# Patient Record
Sex: FEMALE | Race: WHITE | Employment: STUDENT
[De-identification: names, ages, dates, MRNs, and addresses within clinical notes are randomized per-mention and may not be internally consistent; named-entity substitution may affect disease eponyms.]

---
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
## 2019-03-19 ENCOUNTER — OFFICE VISIT (OUTPATIENT)
Dept: FAMILY MEDICINE CLINIC | Age: 19
End: 2019-03-19
Payer: COMMERCIAL

## 2019-03-19 VITALS
TEMPERATURE: 98.1 F | HEART RATE: 94 BPM | WEIGHT: 196 LBS | BODY MASS INDEX: 30.76 KG/M2 | HEIGHT: 67 IN | SYSTOLIC BLOOD PRESSURE: 110 MMHG | DIASTOLIC BLOOD PRESSURE: 64 MMHG | OXYGEN SATURATION: 98 %

## 2019-03-19 DIAGNOSIS — F33.0 MILD EPISODE OF RECURRENT MAJOR DEPRESSIVE DISORDER (HCC): Primary | ICD-10-CM

## 2019-03-19 PROCEDURE — G0444 DEPRESSION SCREEN ANNUAL: HCPCS | Performed by: NURSE PRACTITIONER

## 2019-03-19 PROCEDURE — 99213 OFFICE O/P EST LOW 20 MIN: CPT | Performed by: NURSE PRACTITIONER

## 2019-03-19 RX ORDER — SERTRALINE HYDROCHLORIDE 25 MG/1
25 TABLET, FILM COATED ORAL DAILY
Qty: 30 TABLET | Refills: 0 | Status: SHIPPED | OUTPATIENT
Start: 2019-03-19 | End: 2019-03-27 | Stop reason: SDUPTHER

## 2019-03-19 ASSESSMENT — PATIENT HEALTH QUESTIONNAIRE - PHQ9
4. FEELING TIRED OR HAVING LITTLE ENERGY: 3
SUM OF ALL RESPONSES TO PHQ QUESTIONS 1-9: 15
8. MOVING OR SPEAKING SO SLOWLY THAT OTHER PEOPLE COULD HAVE NOTICED. OR THE OPPOSITE, BEING SO FIGETY OR RESTLESS THAT YOU HAVE BEEN MOVING AROUND A LOT MORE THAN USUAL: 0
5. POOR APPETITE OR OVEREATING: 0
10. IF YOU CHECKED OFF ANY PROBLEMS, HOW DIFFICULT HAVE THESE PROBLEMS MADE IT FOR YOU TO DO YOUR WORK, TAKE CARE OF THINGS AT HOME, OR GET ALONG WITH OTHER PEOPLE: 1
SUM OF ALL RESPONSES TO PHQ9 QUESTIONS 1 & 2: 5
7. TROUBLE CONCENTRATING ON THINGS, SUCH AS READING THE NEWSPAPER OR WATCHING TELEVISION: 1
3. TROUBLE FALLING OR STAYING ASLEEP: 3
1. LITTLE INTEREST OR PLEASURE IN DOING THINGS: 3
2. FEELING DOWN, DEPRESSED OR HOPELESS: 2
9. THOUGHTS THAT YOU WOULD BE BETTER OFF DEAD, OR OF HURTING YOURSELF: 0
6. FEELING BAD ABOUT YOURSELF - OR THAT YOU ARE A FAILURE OR HAVE LET YOURSELF OR YOUR FAMILY DOWN: 3
SUM OF ALL RESPONSES TO PHQ QUESTIONS 1-9: 15

## 2019-03-19 ASSESSMENT — ENCOUNTER SYMPTOMS
COUGH: 0
NAUSEA: 0
VOMITING: 0
DIARRHEA: 0
SHORTNESS OF BREATH: 0

## 2019-03-27 RX ORDER — SERTRALINE HYDROCHLORIDE 25 MG/1
25 TABLET, FILM COATED ORAL DAILY
Qty: 30 TABLET | Refills: 0 | Status: SHIPPED | OUTPATIENT
Start: 2019-03-27 | End: 2019-04-17 | Stop reason: SDUPTHER

## 2019-04-17 ENCOUNTER — OFFICE VISIT (OUTPATIENT)
Dept: FAMILY MEDICINE CLINIC | Age: 19
End: 2019-04-17
Payer: COMMERCIAL

## 2019-04-17 VITALS
TEMPERATURE: 98 F | BODY MASS INDEX: 30.7 KG/M2 | WEIGHT: 196 LBS | SYSTOLIC BLOOD PRESSURE: 112 MMHG | OXYGEN SATURATION: 98 % | HEART RATE: 88 BPM | DIASTOLIC BLOOD PRESSURE: 60 MMHG

## 2019-04-17 DIAGNOSIS — F33.0 MILD EPISODE OF RECURRENT MAJOR DEPRESSIVE DISORDER (HCC): Primary | ICD-10-CM

## 2019-04-17 PROCEDURE — 99213 OFFICE O/P EST LOW 20 MIN: CPT | Performed by: NURSE PRACTITIONER

## 2019-04-17 RX ORDER — SERTRALINE HYDROCHLORIDE 25 MG/1
25 TABLET, FILM COATED ORAL DAILY
Qty: 30 TABLET | Refills: 2 | Status: SHIPPED | OUTPATIENT
Start: 2019-04-17 | End: 2022-07-05 | Stop reason: ALTCHOICE

## 2019-04-17 ASSESSMENT — ENCOUNTER SYMPTOMS
NAUSEA: 0
DIARRHEA: 0
VOMITING: 0
SHORTNESS OF BREATH: 0
COUGH: 0

## 2019-04-17 NOTE — PROGRESS NOTES
HPI Notes    Name: Malena Pino  : 2000         Chief Complaint:     Chief Complaint   Patient presents with   [address] Problem     Patient here today for follow up on anxiety after starting sertraline 25mg daily. Patient said she feels the medicine is helping her. History of Present Illness:        Mental Health Problem   The primary symptoms include dysphoric mood. The current episode started more than 1 month ago. This is a recurrent problem. The onset of the illness is precipitated by emotional stress. The degree of incapacity that she is experiencing as a consequence of her illness is mild. Additional symptoms of the illness do not include insomnia or hypersomnia. She does not admit to suicidal ideas. She does not have a plan to commit suicide. She does not contemplate harming herself. She has not already injured self. She does not contemplate injuring another person. She has not already  injured another person. Past Medical History:     Past Medical History:   Diagnosis Date    Irregular menstrual bleeding       Reviewed all health maintenance requirements and ordered appropriate tests  Health Maintenance Due   Topic Date Due    Varicella Vaccine (1 of 2 - 13+ 2-dose series) 2011    HPV vaccine (1 - Female 3-dose series) 2013    HIV screen  2013    Chlamydia screen  2014    DTaP/Tdap/Td vaccine (1 - Tdap) 2017       Past Surgical History:     Past Surgical History:   Procedure Laterality Date    TEAR DUCT SURGERY      childhood        Medications:       Prior to Admission medications    Medication Sig Start Date End Date Taking? Authorizing Provider   sertraline (ZOLOFT) 25 MG tablet Take 1 tablet by mouth daily 19  Yes Angelo Began, APRN - CNP        Allergies:       Patient has no known allergies. Social History:     Tobacco:    reports that she has never smoked.  She has never used smokeless tobacco.  Alcohol:      reports recurrent major depressive disorder ([facility] [address]       Pt doing well on current dose. Tolerating medication well. Patient denies suicidal ideation at this time, however if patient does become suicidal, stop medication and call 911. Patient verbalizes understanding and agreement with plan. All questions answered. If symptoms do not resolve or worsen, return to office. Completed Refills   Requested Prescriptions     Signed Prescriptions Disp Refills    sertraline (ZOLOFT) 25 MG tablet 30 tablet 2     Sig: Take 1 tablet by mouth daily     No follow-ups on file. Orders Placed This Encounter   Medications    sertraline (ZOLOFT) 25 MG tablet     Sig: Take 1 tablet by mouth daily     Dispense:  30 tablet     Refill:  2     We are requesting this refill to have on file for next month s order. No orders of the defined types were placed in this encounter. Patient Instructions     SURVEY:    You may be receiving a survey from [employer] regarding your visit today. Please complete the survey to enable us to provide the highest quality of care to you and your family. If you cannot score us a very good on any question, please call the office to discuss how we could have made your experience a very good one. Thank you. Electronically signed by JACIEL Altamirano CNP on 4/17/2019 at 10:53 AM           Completed Refills      Requested Prescriptions     Signed Prescriptions Disp Refills    sertraline (ZOLOFT) 25 MG tablet 30 tablet 2     Sig: Take 1 tablet by mouth daily         Tin received counseling on the following healthy behaviors: nutrition and medication adherence  Reviewed prior labs and health maintenance. Continue current medications, diet and exercise. Discussed use, benefit, and side effects of prescribed medications. Barriers to medication compliance addressed. Patient given educational materials - see patient instructions. All patient questions answered. Patient voiced understanding.

## 2019-05-20 ENCOUNTER — HOSPITAL ENCOUNTER (OUTPATIENT)
Age: 19
Discharge: HOME | End: 2019-05-20
Payer: COMMERCIAL

## 2019-05-20 ENCOUNTER — OFFICE VISIT (OUTPATIENT)
Dept: FAMILY MEDICINE CLINIC | Age: 19
End: 2019-05-20
Payer: COMMERCIAL

## 2019-05-20 VITALS
HEART RATE: 94 BPM | HEIGHT: 65 IN | OXYGEN SATURATION: 98 % | WEIGHT: 192 LBS | BODY MASS INDEX: 31.99 KG/M2 | DIASTOLIC BLOOD PRESSURE: 60 MMHG | TEMPERATURE: 98.2 F | SYSTOLIC BLOOD PRESSURE: 102 MMHG

## 2019-05-20 DIAGNOSIS — Z02.0 SCHOOL PHYSICAL EXAM: Primary | ICD-10-CM

## 2019-05-20 LAB
AMPHETAMINE SCREEN URINE: NEGATIVE
BARBITURATE SCREEN URINE: NEGATIVE
BENZODIAZEPINE SCREEN, URINE: NEGATIVE
BUPRENORPHINE URINE: NORMAL
CANNABINOID SCREEN URINE: NEGATIVE
COCAINE METABOLITE, URINE: NEGATIVE
MDMA URINE: NORMAL
METHADONE SCREEN, URINE: NEGATIVE
METHAMPHETAMINE, URINE: NEGATIVE
OPIATES, URINE: NEGATIVE
OXYCODONE SCREEN URINE: NEGATIVE
PHENCYCLIDINE, URINE: NEGATIVE
PROPOXYPHENE, URINE: NEGATIVE
TEST INFORMATION: NORMAL
TRICYCLIC ANTIDEPRESSANTS, UR: NEGATIVE

## 2019-05-20 PROCEDURE — 80306 DRUG TEST PRSMV INSTRMNT: CPT

## 2019-05-20 PROCEDURE — 99395 PREV VISIT EST AGE 18-39: CPT | Performed by: NURSE PRACTITIONER

## 2019-05-20 ASSESSMENT — ENCOUNTER SYMPTOMS
NAUSEA: 0
SORE THROAT: 0
COUGH: 0
DIARRHEA: 0
ABDOMINAL PAIN: 0
BLOOD IN STOOL: 0
SHORTNESS OF BREATH: 0
BACK PAIN: 0
VOMITING: 0
CONSTIPATION: 0

## 2019-05-20 NOTE — PROGRESS NOTES
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 Pt denies any health concerns. Pt does have a hx of depression, but is well controlled with zoloft. Pt denies any suicidal ideation or plan. Past Medical History:     Past Medical History:   Diagnosis Date    Irregular menstrual bleeding       Reviewed all health maintenance requirements and ordered appropriate tests  Health Maintenance Due   Topic Date Due    Varicella Vaccine (1 of 2 - 13+ 2-dose series) 2011    HPV vaccine (1 - Female 3-dose series) 2013    HIV screen  2013    Chlamydia screen  2014    DTaP/Tdap/Td vaccine (1 - Tdap) 2017       Past Surgical History:     Past Surgical History:   Procedure Laterality Date    TEAR DUCT SURGERY      childhood        Medications:       Prior to Admission medications    Medication Sig Start Date End Date Taking? Authorizing Provider   sertraline (ZOLOFT) 25 MG tablet Take 1 tablet by mouth daily 19  Yes JACIEL Abdalla CNP        Allergies:       Patient has no known allergies. Social History:     Tobacco:    reports that she has never smoked. She has never used smokeless tobacco.  Alcohol:      reports that she does not drink alcohol. Drug Use:  reports that she does not use drugs. Family History:        Family History   Problem Relation Age of Onset    High Blood Pressure Mother     Other Mother         hiatal hernia    High Blood Pressure Father        Review of Systems:         Review of Systems   Constitutional: Negative for chills and fever. HENT: Negative for sore throat. Respiratory: Negative for cough and shortness of breath.     Cardiovascular: Negative for chest pain, She has a normal mood and affect. Her speech is normal and behavior is normal. Judgment and thought content normal. Cognition and memory are normal.   Nursing note and vitals reviewed. Data:     No results found for: NA, K, CL, CO2, BUN, CREATININE, GLUCOSE, PROT, LABALBU, BILITOT, ALKPHOS, AST, ALT  No results found for: WBC, RBC, HGB, HCT, MCV, MCH, MCHC, RDW, PLT, MPV  No results found for: TSH  No results found for: CHOL, HDL, PSA, LABA1C       Assessment & Plan        Diagnosis Orders   1. School physical exam       Routine adult health physical.  Patient has no complaints at this time. No abnormalities or deficiencies. Patient educated about adequate water intake  Patient educated about exercise and fitness  Patient educated about nutrition    Health maintenance requirements reviewed with patient    If any further needs, return to office. Completed Refills   Requested Prescriptions      No prescriptions requested or ordered in this encounter     Return in about 1 year (around 5/20/2020). No orders of the defined types were placed in this encounter. No orders of the defined types were placed in this encounter. Patient Instructions     SURVEY:    You may be receiving a survey from [employer] regarding your visit today. Please complete the survey to enable us to provide the highest quality of care to you and your family. If you cannot score us a very good on any question, please call the office to discuss how we could have made your experience a very good one. Thank you. Electronically signed by JACIEL Zarate CNP on 5/20/2019 at 12:16 PM           Completed Refills      Requested Prescriptions      No prescriptions requested or ordered in this encounter         Sai Garcia received counseling on the following healthy behaviors: nutrition, exercise and medication adherence  Reviewed prior labs and health maintenance.   Continue current medications, diet and exercise. Discussed use, benefit, and side effects of prescribed medications. Barriers to medication compliance addressed. Patient given educational materials - see patient instructions. All patient questions answered. Patient voiced understanding.

## 2019-05-20 NOTE — PATIENT INSTRUCTIONS
SURVEY:    You may be receiving a survey from [employer] regarding your visit today. Please complete the survey to enable us to provide the highest quality of care to you and your family. If you cannot score us a very good on any question, please call the office to discuss how we could have made your experience a very good one. Thank you.

## 2019-08-21 ENCOUNTER — TELEPHONE (OUTPATIENT)
Dept: FAMILY MEDICINE CLINIC | Age: 19
End: 2019-08-21

## 2019-08-21 DIAGNOSIS — Z23 NEED FOR VARICELLA VACCINE: Primary | ICD-10-CM

## 2019-08-23 ENCOUNTER — HOSPITAL ENCOUNTER (OUTPATIENT)
Age: 19
Discharge: HOME | End: 2019-08-23
Payer: COMMERCIAL

## 2019-08-23 DIAGNOSIS — Z23 NEED FOR VARICELLA VACCINE: ICD-10-CM

## 2019-08-23 PROCEDURE — 86787 VARICELLA-ZOSTER ANTIBODY: CPT

## 2019-08-23 PROCEDURE — 36415 COLL VENOUS BLD VENIPUNCTURE: CPT

## 2019-08-26 LAB — VZV IGG SER QL IA: 3.45

## 2022-06-06 ENCOUNTER — OFFICE VISIT (OUTPATIENT)
Dept: FAMILY MEDICINE CLINIC | Age: 22
End: 2022-06-06
Payer: COMMERCIAL

## 2022-06-06 ENCOUNTER — HOSPITAL ENCOUNTER (OUTPATIENT)
Age: 22
Discharge: HOME | End: 2022-06-06
Payer: COMMERCIAL

## 2022-06-06 VITALS
BODY MASS INDEX: 34.04 KG/M2 | WEIGHT: 204.3 LBS | TEMPERATURE: 98.5 F | DIASTOLIC BLOOD PRESSURE: 82 MMHG | HEART RATE: 96 BPM | OXYGEN SATURATION: 98 % | HEIGHT: 65 IN | SYSTOLIC BLOOD PRESSURE: 118 MMHG

## 2022-06-06 DIAGNOSIS — R19.7 DIARRHEA, UNSPECIFIED TYPE: Primary | ICD-10-CM

## 2022-06-06 DIAGNOSIS — K92.1: ICD-10-CM

## 2022-06-06 DIAGNOSIS — R19.7 DIARRHEA, UNSPECIFIED TYPE: ICD-10-CM

## 2022-06-06 DIAGNOSIS — K21.9 GASTROESOPHAGEAL REFLUX DISEASE WITHOUT ESOPHAGITIS: ICD-10-CM

## 2022-06-06 DIAGNOSIS — R10.9 ABDOMINAL CRAMPING: ICD-10-CM

## 2022-06-06 LAB
ABSOLUTE EOS #: 0 K/UL (ref 0–0.4)
ABSOLUTE LYMPH #: 1.9 K/UL (ref 1–4.8)
ABSOLUTE MONO #: 0.5 K/UL (ref 0–1)
ALBUMIN SERPL-MCNC: 4.3 G/DL (ref 3.5–5.2)
ALP BLD-CCNC: 77 U/L (ref 35–104)
ALT SERPL-CCNC: 23 U/L (ref 5–33)
ANION GAP SERPL CALCULATED.3IONS-SCNC: 11 MMOL/L (ref 9–17)
AST SERPL-CCNC: 15 U/L
BASOPHILS # BLD: 0 % (ref 0–2)
BASOPHILS ABSOLUTE: 0 K/UL (ref 0–0.2)
BILIRUB SERPL-MCNC: 0.51 MG/DL (ref 0.3–1.2)
BUN BLDV-MCNC: 9 MG/DL (ref 6–20)
BUN/CREAT BLD: 12 (ref 9–20)
CALCIUM SERPL-MCNC: 9.6 MG/DL (ref 8.6–10.4)
CHLORIDE BLD-SCNC: 102 MMOL/L (ref 98–107)
CO2: 25 MMOL/L (ref 20–31)
CREAT SERPL-MCNC: 0.77 MG/DL (ref 0.5–0.9)
DIFFERENTIAL TYPE: YES
EOSINOPHILS RELATIVE PERCENT: 0 % (ref 0–5)
GFR AFRICAN AMERICAN: >60 ML/MIN
GFR NON-AFRICAN AMERICAN: >60 ML/MIN
GFR SERPL CREATININE-BSD FRML MDRD: NORMAL ML/MIN/{1.73_M2}
GLUCOSE BLD-MCNC: 93 MG/DL (ref 70–99)
HCT VFR BLD CALC: 43.5 % (ref 36–46)
HEMOGLOBIN: 14.6 G/DL (ref 12–16)
LYMPHOCYTES # BLD: 21 % (ref 15–40)
MCH RBC QN AUTO: 28.9 PG (ref 26–34)
MCHC RBC AUTO-ENTMCNC: 33.5 G/DL (ref 31–37)
MCV RBC AUTO: 86.2 FL (ref 80–100)
MONOCYTES # BLD: 5 % (ref 4–8)
PDW BLD-RTO: 12.3 % (ref 12.1–15.2)
PLATELET # BLD: 265 K/UL (ref 140–450)
POTASSIUM SERPL-SCNC: 4.2 MMOL/L (ref 3.7–5.3)
RBC # BLD: 5.05 M/UL (ref 4–5.2)
SEG NEUTROPHILS: 74 % (ref 47–75)
SEGMENTED NEUTROPHILS ABSOLUTE COUNT: 6.6 K/UL (ref 2.5–7)
SODIUM BLD-SCNC: 138 MMOL/L (ref 135–144)
TOTAL PROTEIN: 7.4 G/DL (ref 6.4–8.3)
WBC # BLD: 9.1 K/UL (ref 3.5–11)

## 2022-06-06 PROCEDURE — 99213 OFFICE O/P EST LOW 20 MIN: CPT | Performed by: NURSE PRACTITIONER

## 2022-06-06 PROCEDURE — 85025 COMPLETE CBC W/AUTO DIFF WBC: CPT

## 2022-06-06 PROCEDURE — 80053 COMPREHEN METABOLIC PANEL: CPT

## 2022-06-06 PROCEDURE — 82784 ASSAY IGA/IGD/IGG/IGM EACH: CPT

## 2022-06-06 PROCEDURE — 81382 HLA II TYPING 1 LOC HR: CPT

## 2022-06-06 PROCEDURE — 88346 IMFLUOR 1ST 1ANTB STAIN PX: CPT

## 2022-06-06 PROCEDURE — 83520 IMMUNOASSAY QUANT NOS NONAB: CPT

## 2022-06-06 PROCEDURE — 36415 COLL VENOUS BLD VENIPUNCTURE: CPT

## 2022-06-06 RX ORDER — OMEPRAZOLE 20 MG/1
20 CAPSULE, DELAYED RELEASE ORAL DAILY
Qty: 30 CAPSULE | Refills: 0 | Status: SHIPPED | OUTPATIENT
Start: 2022-06-06 | End: 2022-07-05 | Stop reason: SDUPTHER

## 2022-06-06 SDOH — ECONOMIC STABILITY: FOOD INSECURITY: WITHIN THE PAST 12 MONTHS, THE FOOD YOU BOUGHT JUST DIDN'T LAST AND YOU DIDN'T HAVE MONEY TO GET MORE.: NEVER TRUE

## 2022-06-06 SDOH — ECONOMIC STABILITY: FOOD INSECURITY: WITHIN THE PAST 12 MONTHS, YOU WORRIED THAT YOUR FOOD WOULD RUN OUT BEFORE YOU GOT MONEY TO BUY MORE.: NEVER TRUE

## 2022-06-06 ASSESSMENT — PATIENT HEALTH QUESTIONNAIRE - PHQ9
6. FEELING BAD ABOUT YOURSELF - OR THAT YOU ARE A FAILURE OR HAVE LET YOURSELF OR YOUR FAMILY DOWN: 0
2. FEELING DOWN, DEPRESSED OR HOPELESS: 3
SUM OF ALL RESPONSES TO PHQ QUESTIONS 1-9: 8
10. IF YOU CHECKED OFF ANY PROBLEMS, HOW DIFFICULT HAVE THESE PROBLEMS MADE IT FOR YOU TO DO YOUR WORK, TAKE CARE OF THINGS AT HOME, OR GET ALONG WITH OTHER PEOPLE: 0
4. FEELING TIRED OR HAVING LITTLE ENERGY: 2
8. MOVING OR SPEAKING SO SLOWLY THAT OTHER PEOPLE COULD HAVE NOTICED. OR THE OPPOSITE, BEING SO FIGETY OR RESTLESS THAT YOU HAVE BEEN MOVING AROUND A LOT MORE THAN USUAL: 0
SUM OF ALL RESPONSES TO PHQ QUESTIONS 1-9: 8
SUM OF ALL RESPONSES TO PHQ QUESTIONS 1-9: 8
1. LITTLE INTEREST OR PLEASURE IN DOING THINGS: 0
SUM OF ALL RESPONSES TO PHQ QUESTIONS 1-9: 8
3. TROUBLE FALLING OR STAYING ASLEEP: 3
5. POOR APPETITE OR OVEREATING: 0
7. TROUBLE CONCENTRATING ON THINGS, SUCH AS READING THE NEWSPAPER OR WATCHING TELEVISION: 0
9. THOUGHTS THAT YOU WOULD BE BETTER OFF DEAD, OR OF HURTING YOURSELF: 0
SUM OF ALL RESPONSES TO PHQ9 QUESTIONS 1 & 2: 3

## 2022-06-06 ASSESSMENT — ENCOUNTER SYMPTOMS
COUGH: 0
SHORTNESS OF BREATH: 0
BLOATING: 1
VOMITING: 0
NAUSEA: 0
DIARRHEA: 1
ABDOMINAL PAIN: 1

## 2022-06-06 ASSESSMENT — SOCIAL DETERMINANTS OF HEALTH (SDOH): HOW HARD IS IT FOR YOU TO PAY FOR THE VERY BASICS LIKE FOOD, HOUSING, MEDICAL CARE, AND HEATING?: NOT HARD AT ALL

## 2022-06-06 NOTE — PROGRESS NOTES
HPI Notes    Name: Nessa Pitts  : 2000         Chief Complaint:     Chief Complaint   Patient presents with    Diarrhea     pt stated she has had biarrhea for many years, last 2 bowel movements she had had bool in the stool       History of Present Illness:        Diarrhea   This is a chronic problem. The current episode started more than 1 year ago. The problem occurs 5 to 10 times per day. The problem has been waxing and waning. The stool consistency is described as bloody (blood in stool on 6/3/22). The patient states that diarrhea awakens (about once a week) her from sleep. Associated symptoms include abdominal pain and bloating. Pertinent negatives include no chills, coughing, fever, headaches or vomiting. The symptoms are aggravated by rye/wheat and stress. There are no known risk factors. She has tried anti-motility drug (TUMS) for the symptoms. The treatment provided mild relief. Past Medical History:     Past Medical History:   Diagnosis Date    Irregular menstrual bleeding       Reviewed all health maintenance requirements and ordered appropriate tests  Health Maintenance Due   Topic Date Due    Varicella vaccine (1 of 2 - 2-dose childhood series) Never done    HPV vaccine (1 - 2-dose series) Never done    Depression Screen  Never done    HIV screen  Never done    Chlamydia screen  Never done    Hepatitis C screen  Never done    Pap smear  Never done       Past Surgical History:     Past Surgical History:   Procedure Laterality Date    TEAR DUCT SURGERY      childhood        Medications:       Prior to Admission medications    Medication Sig Start Date End Date Taking?  Authorizing Provider   omeprazole (PRILOSEC) 20 MG delayed release capsule Take 1 capsule by mouth daily 22  Yes JACIEL Lan CNP   sertraline (ZOLOFT) 25 MG tablet Take 1 tablet by mouth daily  Patient not taking: Reported on 2022   JACIEL Lan CNP        Allergies: Patient has no known allergies. Social History:     Tobacco:    reports that she has never smoked. She has never used smokeless tobacco.  Alcohol:      reports no history of alcohol use. Drug Use:  reports no history of drug use. Family History:     Family History   Problem Relation Age of Onset    High Blood Pressure Mother     Other Mother         hiatal hernia    High Blood Pressure Father        Review of Systems:         Review of Systems   Constitutional: Negative for chills and fever. Respiratory: Negative for cough and shortness of breath. Cardiovascular: Negative for chest pain and palpitations. Gastrointestinal: Positive for abdominal pain, bloating and diarrhea. Negative for nausea and vomiting. Neurological: Negative for dizziness, seizures and headaches. Physical Exam:     Vitals:  /82   Pulse 96   Temp 98.5 °F (36.9 °C) (Oral)   Ht 5' 5\" (1.651 m)   Wt 204 lb 4.8 oz (92.7 kg)   LMP 05/15/2022   SpO2 98%   BMI 34.00 kg/m²       Physical Exam  Vitals and nursing note reviewed. Constitutional:       Appearance: Normal appearance. She is well-developed. Cardiovascular:      Rate and Rhythm: Normal rate and regular rhythm. Heart sounds: Normal heart sounds, S1 normal and S2 normal.   Pulmonary:      Effort: Pulmonary effort is normal. No respiratory distress. Breath sounds: Normal breath sounds. Abdominal:      General: Bowel sounds are normal.      Palpations: Abdomen is soft. Tenderness: There is no abdominal tenderness. Skin:     General: Skin is warm and dry. Neurological:      Mental Status: She is alert and oriented to person, place, and time.                Data:     No results found for: NA, K, CL, CO2, BUN, CREATININE, GLUCOSE, PROT, LABALBU, BILITOT, ALKPHOS, AST, ALT  No results found for: WBC, RBC, HGB, HCT, MCV, MCH, MCHC, RDW, PLT, MPV  No results found for: TSH  No results found for: CHOL, LDL, HDL, PSA, LABA1C Assessment & Plan        Diagnosis Orders   1. Diarrhea, unspecified type  Celiac Disease Panel    CBC with Auto Differential   2. Blood in stool, fel[place]  CBC with Auto Differential    Comprehensive Metabolic Panel   3. Gastroesophageal reflux disease without esophagitis  Comprehensive Metabolic Panel   4. Abdominal cramping  Celiac Disease Panel     Diff Dx includes: IBS-D vs GERD vs celiac disease vs other. Will start back on omeprazole as this was effective in the past. Will send for labs. Completed Refills   Requested Prescriptions     Signed Prescriptions Disp Refills    omeprazole (PRILOSEC) 20 MG delayed release capsule 30 capsule 0     Sig: Take 1 capsule by mouth daily     Return in about 4 weeks (around 7/4/2022) for GERD. Orders Placed This Encounter   Medications    omeprazole (PRILOSEC) 20 MG delayed release capsule     Sig: Take 1 capsule by mouth daily     Dispense:  30 capsule     Refill:  0     Orders Placed This Encounter   Procedures    Celiac Disease Panel     Standing Status:   Future     Standing Expiration Date:   6/6/2023    CBC with Auto Differential     Standing Status:   Future     Standing Expiration Date:   7/11/2023    Comprehensive Metabolic Panel     Standing Status:   Future     Standing Expiration Date:   7/11/2023         There are no Patient Instructions on file for this visit.     Electronically signed by JACIEL Moraes CNP on 6/6/2022 at 11:12 AM           Completed Refills   Requested Prescriptions     Signed Prescriptions Disp Refills    omeprazole (PRILOSEC) 20 MG delayed release capsule 30 capsule 0     Sig: Take 1 capsule by mouth daily

## 2022-06-10 LAB — CELIAC PANEL: NORMAL

## 2022-07-05 ENCOUNTER — OFFICE VISIT (OUTPATIENT)
Dept: FAMILY MEDICINE CLINIC | Age: 22
End: 2022-07-05
Payer: COMMERCIAL

## 2022-07-05 VITALS
SYSTOLIC BLOOD PRESSURE: 98 MMHG | OXYGEN SATURATION: 98 % | HEART RATE: 88 BPM | DIASTOLIC BLOOD PRESSURE: 64 MMHG | TEMPERATURE: 98.1 F

## 2022-07-05 DIAGNOSIS — K21.9 GASTROESOPHAGEAL REFLUX DISEASE WITHOUT ESOPHAGITIS: Primary | ICD-10-CM

## 2022-07-05 DIAGNOSIS — K58.0 IRRITABLE BOWEL SYNDROME WITH DIARRHEA: ICD-10-CM

## 2022-07-05 PROCEDURE — 99213 OFFICE O/P EST LOW 20 MIN: CPT | Performed by: NURSE PRACTITIONER

## 2022-07-05 RX ORDER — OMEPRAZOLE 20 MG/1
20 CAPSULE, DELAYED RELEASE ORAL DAILY
Qty: 90 CAPSULE | Refills: 1 | Status: SHIPPED | OUTPATIENT
Start: 2022-07-05

## 2022-07-05 ASSESSMENT — ENCOUNTER SYMPTOMS
HEARTBURN: 0
ABDOMINAL PAIN: 0
VOMITING: 0
SHORTNESS OF BREATH: 0
COUGH: 0
DIARRHEA: 0
GLOBUS SENSATION: 0
NAUSEA: 0

## 2022-07-05 NOTE — PATIENT INSTRUCTIONS
SURVEY:    You may be receiving a survey from [employer] regarding your visit today. Please complete the survey to enable us to provide the highest quality of care to you and your family. If you cannot score us a very good (5 Stars) on any question, please call the office to discuss how we could have made your experience a very good one. Thank you.     Clinical Care Team: JACIEL Colon-TANYA Granda LPN    Clerical Team: Negrita Mancilla

## 2022-07-05 NOTE — PROGRESS NOTES
HPI Notes    Name: Ray Fan  : 2000         Chief Complaint:     Chief Complaint   Patient presents with    Gastroesophageal Reflux     Patient here today for follow up after starting the omeprazole 20 mg , patient said she is doing better. History of Present Illness:        Gastroesophageal Reflux  She reports no abdominal pain, no chest pain, no coughing, no globus sensation, no heartburn or no nausea. This is a chronic problem. The current episode started more than 1 month ago. Nothing aggravates the symptoms. She has tried a PPI for the symptoms. Still having diarrhea, about 5-7 times daily. Mostly soft and partly watery consistency. Takes imodium and it slows down for that day, but returns the next day. Past Medical History:     Past Medical History:   Diagnosis Date    Irregular menstrual bleeding       Reviewed all health maintenance requirements and ordered appropriate tests  Health Maintenance Due   Topic Date Due    Varicella vaccine (1 of 2 - 2-dose childhood series) Never done    HPV vaccine (1 - 2-dose series) Never done    HIV screen  Never done    Chlamydia screen  Never done    Hepatitis C screen  Never done    Pap smear  Never done       Past Surgical History:     Past Surgical History:   Procedure Laterality Date    TEAR DUCT SURGERY      childhood        Medications:       Prior to Admission medications    Medication Sig Start Date End Date Taking? Authorizing Provider   omeprazole (PRILOSEC) 20 MG delayed release capsule Take 1 capsule by mouth daily 22  Yes JACIEL Dacosta CNP   Eluxadoline 100 MG TABS Take 100 mg by mouth 2 times daily for 30 days. 22 Yes JACIEL Dacosta CNP        Allergies:       Patient has no known allergies. Social History:     Tobacco:    reports that she has never smoked. She has never used smokeless tobacco.  Alcohol:      reports no history of alcohol use.   Drug Use:  reports no history of drug use. Family History:     Family History   Problem Relation Age of Onset    High Blood Pressure Mother     Other Mother         hiatal hernia    High Blood Pressure Father        Review of Systems:         Review of Systems   Constitutional: Negative for chills and fever. Respiratory: Negative for cough and shortness of breath. Cardiovascular: Negative for chest pain and palpitations. Gastrointestinal: Negative for abdominal pain, diarrhea, heartburn, nausea and vomiting. Neurological: Negative for dizziness, seizures and headaches. Physical Exam:     Vitals:  BP 98/64   Pulse 88   Temp 98.1 °F (36.7 °C) (Oral)   SpO2 98%       Physical Exam  Vitals and nursing note reviewed. Constitutional:       Appearance: Normal appearance. She is well-developed. Cardiovascular:      Rate and Rhythm: Normal rate and regular rhythm. Heart sounds: Normal heart sounds, S1 normal and S2 normal.   Pulmonary:      Effort: Pulmonary effort is normal. No respiratory distress. Breath sounds: Normal breath sounds. Abdominal:      General: Bowel sounds are normal.      Palpations: Abdomen is soft. Tenderness: There is no abdominal tenderness. Skin:     General: Skin is warm and dry. Neurological:      Mental Status: She is alert and oriented to person, place, and time.                Data:     Lab Results   Component Value Date/Time     06/06/2022 11:29 AM    K 4.2 06/06/2022 11:29 AM     06/06/2022 11:29 AM    CO2 25 06/06/2022 11:29 AM    BUN 9 06/06/2022 11:29 AM    CREATININE 0.77 06/06/2022 11:29 AM    GLUCOSE 93 06/06/2022 11:29 AM    PROT 7.4 06/06/2022 11:29 AM    LABALBU 4.3 06/06/2022 11:29 AM    BILITOT 0.51 06/06/2022 11:29 AM    ALKPHOS 77 06/06/2022 11:29 AM    AST 15 06/06/2022 11:29 AM    ALT 23 06/06/2022 11:29 AM     Lab Results   Component Value Date/Time    WBC 9.1 06/06/2022 11:29 AM    RBC 5.05 06/06/2022 11:29 AM    HGB 14.6 06/06/2022 11:29 AM    HCT 43.5 06/06/2022 11:29 AM    MCV 86.2 06/06/2022 11:29 AM    MCH 28.9 06/06/2022 11:29 AM    MCHC 33.5 06/06/2022 11:29 AM    RDW 12.3 06/06/2022 11:29 AM     06/06/2022 11:29 AM     No results found for: TSH  No results found for: CHOL, LDL, HDL, PSA, LABA1C       Assessment & Plan        Diagnosis Orders   1. Gastroesophageal reflux disease without esophagitis  -- Doing well with omeprazole 20 mg daily. Symptoms of abdominal cramping have resolved. Continue same medication. 2. Irritable bowel syndrome with diarrhea   -- Despite the abdominal cramping going away, patient still having multiple diarrhea stools daily. Will try Viberzi 100 mg p.o. twice daily x30 days. If symptoms do not improve, will send to GI for evaluation. Eluxadoline 100 MG TABS     Patient verbalizes understanding and agreement with plan. All questions answered. If symptoms do not resolve or worsen, return to office. Completed Refills   Requested Prescriptions     Signed Prescriptions Disp Refills    omeprazole (PRILOSEC) 20 MG delayed release capsule 90 capsule 1     Sig: Take 1 capsule by mouth daily    Eluxadoline 100 MG TABS 60 tablet 0     Sig: Take 100 mg by mouth 2 times daily for 30 days. No follow-ups on file. Orders Placed This Encounter   Medications    omeprazole (PRILOSEC) 20 MG delayed release capsule     Sig: Take 1 capsule by mouth daily     Dispense:  90 capsule     Refill:  1    Eluxadoline 100 MG TABS     Sig: Take 100 mg by mouth 2 times daily for 30 days. Dispense:  60 tablet     Refill:  0     No orders of the defined types were placed in this encounter. Patient Instructions   SURVEY:    You may be receiving a survey from [employer] regarding your visit today. Please complete the survey to enable us to provide the highest quality of care to you and your family.     If you cannot score us a very good (5 Stars) on any question, please call the office to discuss how we could have made your experience a very good one. Thank you. Clinical Care Team: LORRAINE Coleman LPN    Clerical Team: Jayy Martinez        Electronically signed by JACIEL Coleman CNP on 7/5/2022 at 7:37 AM           Completed Refills   Requested Prescriptions     Signed Prescriptions Disp Refills    omeprazole (PRILOSEC) 20 MG delayed release capsule 90 capsule 1     Sig: Take 1 capsule by mouth daily    Eluxadoline 100 MG TABS 60 tablet 0     Sig: Take 100 mg by mouth 2 times daily for 30 days.

## 2022-07-07 ENCOUNTER — TELEPHONE (OUTPATIENT)
Dept: FAMILY MEDICINE CLINIC | Age: 22
End: 2022-07-07

## 2022-07-07 DIAGNOSIS — K58.0 IRRITABLE BOWEL SYNDROME WITH DIARRHEA: Primary | ICD-10-CM

## 2022-07-07 NOTE — TELEPHONE ENCOUNTER
Please let patient know that I was thinking about her case. I would like to try her on Xifaxan 1 tablet 3 times daily x14 days. If after the 2 weeks there is no improvement, we can start Viberzi. Xifaxan is meant to fix the intestinal bacterial balance and sometimes with IBS-D you can take this medicine for 2 weeks and you are cured and then do not need any medicine. I would like to give this a try before starting Felicia Leisure which she would need to take most likely for the rest of her life. We have a $0 co-pay card in the office that she can use.

## 2022-07-07 NOTE — TELEPHONE ENCOUNTER
Message left for patient regarding xifaxan rx and Elvin's recommendations. Told patient to call our office for more details.

## 2023-09-16 ENCOUNTER — HOSPITAL ENCOUNTER (EMERGENCY)
Age: 23
Discharge: HOME | End: 2023-09-16
Payer: COMMERCIAL

## 2023-09-16 VITALS
OXYGEN SATURATION: 98 % | SYSTOLIC BLOOD PRESSURE: 140 MMHG | DIASTOLIC BLOOD PRESSURE: 92 MMHG | TEMPERATURE: 98.6 F | RESPIRATION RATE: 18 BRPM | HEART RATE: 129 BPM

## 2023-09-16 VITALS — BODY MASS INDEX: 37.8 KG/M2

## 2023-09-16 DIAGNOSIS — Z3A.01: ICD-10-CM

## 2023-09-16 DIAGNOSIS — O20.0: Primary | ICD-10-CM

## 2023-09-16 LAB
ADD MANUAL DIFF: NO
HCT VFR BLD CALC: 40.7 % (ref 36–48)
HEMATOCRIT: 40.7 % (ref 36–48)
HEMOGLOBIN: 13.9 G/DL (ref 12–16)
IMMATURE GRANULOCYTES ABS AUTO: 0.03 10^3/UL (ref 0–0.03)
IMMATURE GRANULOCYTES PCT AUTO: 0.3 % (ref 0–0.5)
LYMPHOCYTES  ABSOLUTE AUTO: 2.9 10^3/UL (ref 1.2–3.8)
MCV RBC: 86.8 FL (ref 81–99)
MEAN CORPUSCULAR HEMOGLOBIN: 29.6 PG (ref 26.7–34)
MEAN CORPUSCULAR HGB CONC: 34.2 G/DL (ref 29.9–35.2)
MEAN CORPUSCULAR VOLUME: 86.8 FL (ref 81–99)
PLATELET # BLD: 281 10^3/UL (ref 150–450)
PLATELET COUNT: 281 10^3/UL (ref 150–450)
RED BLOOD COUNT: 4.69 10^6/UL (ref 4.2–5.4)
WBC # BLD: 11.4 10^3/UL (ref 4–11)
WHITE BLOOD COUNT: 11.4 10^3/UL (ref 4–11)

## 2023-09-16 PROCEDURE — 76817 TRANSVAGINAL US OBSTETRIC: CPT

## 2023-09-16 PROCEDURE — 85025 COMPLETE CBC W/AUTO DIFF WBC: CPT

## 2023-09-16 PROCEDURE — 86901 BLOOD TYPING SEROLOGIC RH(D): CPT

## 2023-09-16 PROCEDURE — 99284 EMERGENCY DEPT VISIT MOD MDM: CPT

## 2023-09-16 PROCEDURE — 86900 BLOOD TYPING SEROLOGIC ABO: CPT

## 2023-09-16 PROCEDURE — 84702 CHORIONIC GONADOTROPIN TEST: CPT

## 2023-09-16 PROCEDURE — 36415 COLL VENOUS BLD VENIPUNCTURE: CPT

## 2023-09-18 ENCOUNTER — HOSPITAL ENCOUNTER
Dept: HOSPITAL 101 - LAB | Age: 23
Discharge: HOME | End: 2023-09-18
Payer: COMMERCIAL

## 2023-09-18 DIAGNOSIS — O20.0: Primary | ICD-10-CM

## 2023-09-18 PROCEDURE — 84702 CHORIONIC GONADOTROPIN TEST: CPT

## 2023-09-18 PROCEDURE — 36415 COLL VENOUS BLD VENIPUNCTURE: CPT

## 2023-09-21 ENCOUNTER — HOSPITAL ENCOUNTER
Dept: HOSPITAL 101 - US | Age: 23
Discharge: HOME | End: 2023-09-21
Payer: COMMERCIAL

## 2023-09-21 DIAGNOSIS — N92.6: ICD-10-CM

## 2023-09-21 DIAGNOSIS — Z34.91: Primary | ICD-10-CM

## 2023-09-21 PROCEDURE — 76817 TRANSVAGINAL US OBSTETRIC: CPT

## 2023-10-08 ENCOUNTER — HOSPITAL ENCOUNTER (EMERGENCY)
Age: 23
Discharge: HOME | End: 2023-10-08
Attending: EMERGENCY MEDICINE

## 2023-10-08 VITALS
DIASTOLIC BLOOD PRESSURE: 87 MMHG | RESPIRATION RATE: 19 BRPM | HEART RATE: 98 BPM | SYSTOLIC BLOOD PRESSURE: 129 MMHG | TEMPERATURE: 98 F | BODY MASS INDEX: 31.88 KG/M2 | OXYGEN SATURATION: 100 % | WEIGHT: 191.58 LBS

## 2023-10-08 DIAGNOSIS — O21.9 VOMITING DURING PREGNANCY: Primary | ICD-10-CM

## 2023-10-08 LAB
ANION GAP SERPL CALCULATED.3IONS-SCNC: 15 MMOL/L (ref 9–17)
BASOPHILS # BLD: 0.03 K/UL (ref 0–0.2)
BASOPHILS NFR BLD: 0 % (ref 0–2)
BUN SERPL-MCNC: 7 MG/DL (ref 6–20)
BUN/CREAT SERPL: 10 (ref 9–20)
CALCIUM SERPL-MCNC: 10.2 MG/DL (ref 8.6–10.4)
CHLORIDE SERPL-SCNC: 98 MMOL/L (ref 98–107)
CO2 SERPL-SCNC: 22 MMOL/L (ref 20–31)
CREAT SERPL-MCNC: 0.7 MG/DL (ref 0.5–0.9)
EOSINOPHIL # BLD: 0.03 K/UL (ref 0–0.4)
EOSINOPHILS RELATIVE PERCENT: 0 % (ref 0–5)
ERYTHROCYTE [DISTWIDTH] IN BLOOD BY AUTOMATED COUNT: 11.8 % (ref 12.1–15.2)
GFR SERPL CREATININE-BSD FRML MDRD: >60 ML/MIN/1.73M2
GLUCOSE SERPL-MCNC: 113 MG/DL (ref 70–99)
HCT VFR BLD AUTO: 43.4 % (ref 36–46)
HGB BLD-MCNC: 15.5 G/DL (ref 12–16)
IMM GRANULOCYTES # BLD AUTO: 0.02 K/UL (ref 0–0.3)
IMM GRANULOCYTES NFR BLD: 0 % (ref 0–5)
LYMPHOCYTES NFR BLD: 2.33 K/UL (ref 1–4.8)
LYMPHOCYTES RELATIVE PERCENT: 22 % (ref 15–40)
MCH RBC QN AUTO: 28.9 PG (ref 26–34)
MCHC RBC AUTO-ENTMCNC: 35.7 G/DL (ref 31–37)
MCV RBC AUTO: 80.8 FL (ref 80–100)
MONOCYTES NFR BLD: 0.88 K/UL (ref 0–1)
MONOCYTES NFR BLD: 8 % (ref 4–8)
NEUTROPHILS NFR BLD: 70 % (ref 47–75)
NEUTS SEG NFR BLD: 7.51 K/UL (ref 2.5–7)
PLATELET # BLD AUTO: 279 K/UL (ref 140–450)
PMV BLD AUTO: 10.5 FL (ref 6–12)
POTASSIUM SERPL-SCNC: 3.4 MMOL/L (ref 3.7–5.3)
RBC # BLD AUTO: 5.37 M/UL (ref 4–5.2)
SODIUM SERPL-SCNC: 135 MMOL/L (ref 135–144)
WBC OTHER # BLD: 10.8 K/UL (ref 3.5–11)

## 2023-10-08 PROCEDURE — 2580000003 HC RX 258: Performed by: EMERGENCY MEDICINE

## 2023-10-08 PROCEDURE — 80048 BASIC METABOLIC PNL TOTAL CA: CPT

## 2023-10-08 PROCEDURE — 6360000002 HC RX W HCPCS: Performed by: EMERGENCY MEDICINE

## 2023-10-08 PROCEDURE — 85025 COMPLETE CBC W/AUTO DIFF WBC: CPT

## 2023-10-08 PROCEDURE — 99284 EMERGENCY DEPT VISIT MOD MDM: CPT

## 2023-10-08 PROCEDURE — 96374 THER/PROPH/DIAG INJ IV PUSH: CPT

## 2023-10-08 RX ORDER — PROMETHAZINE HYDROCHLORIDE 25 MG/1
25 SUPPOSITORY RECTAL EVERY 6 HOURS PRN
Qty: 12 SUPPOSITORY | Refills: 0 | Status: SHIPPED | OUTPATIENT
Start: 2023-10-08 | End: 2023-10-15

## 2023-10-08 RX ORDER — 0.9 % SODIUM CHLORIDE 0.9 %
1000 INTRAVENOUS SOLUTION INTRAVENOUS ONCE
Status: COMPLETED | OUTPATIENT
Start: 2023-10-08 | End: 2023-10-08

## 2023-10-08 RX ORDER — PROMETHAZINE HYDROCHLORIDE 12.5 MG/1
12.5 TABLET ORAL EVERY 4 HOURS
Qty: 180 TABLET | Refills: 2 | COMMUNITY
Start: 2023-10-06 | End: 2023-10-08

## 2023-10-08 RX ORDER — ONDANSETRON 4 MG/1
4 TABLET, ORALLY DISINTEGRATING ORAL EVERY 6 HOURS PRN
COMMUNITY
Start: 2023-09-27 | End: 2023-10-12

## 2023-10-08 RX ORDER — ONDANSETRON 2 MG/ML
4 INJECTION INTRAMUSCULAR; INTRAVENOUS
Status: DISCONTINUED | OUTPATIENT
Start: 2023-10-08 | End: 2023-10-09 | Stop reason: HOSPADM

## 2023-10-08 RX ADMIN — SODIUM CHLORIDE 1000 ML: 9 INJECTION, SOLUTION INTRAVENOUS at 20:56

## 2023-10-08 RX ADMIN — SODIUM CHLORIDE 1000 ML: 9 INJECTION, SOLUTION INTRAVENOUS at 22:00

## 2023-10-08 RX ADMIN — ONDANSETRON 4 MG: 2 INJECTION INTRAMUSCULAR; INTRAVENOUS at 20:52

## 2023-10-08 ASSESSMENT — PATIENT HEALTH QUESTIONNAIRE - PHQ9
SUM OF ALL RESPONSES TO PHQ QUESTIONS 1-9: 0
SUM OF ALL RESPONSES TO PHQ9 QUESTIONS 1 & 2: 0
1. LITTLE INTEREST OR PLEASURE IN DOING THINGS: 0
2. FEELING DOWN, DEPRESSED OR HOPELESS: 0

## 2023-10-08 ASSESSMENT — ENCOUNTER SYMPTOMS
SHORTNESS OF BREATH: 0
COLOR CHANGE: 0
NAUSEA: 1
DIARRHEA: 0
EYE PAIN: 0
BACK PAIN: 0
COUGH: 0
EYE REDNESS: 0
ABDOMINAL PAIN: 0
VOMITING: 1
TROUBLE SWALLOWING: 0
SORE THROAT: 0

## 2023-10-08 ASSESSMENT — LIFESTYLE VARIABLES
HOW MANY STANDARD DRINKS CONTAINING ALCOHOL DO YOU HAVE ON A TYPICAL DAY: PATIENT DOES NOT DRINK
HOW OFTEN DO YOU HAVE A DRINK CONTAINING ALCOHOL: NEVER
HOW OFTEN DO YOU HAVE A DRINK CONTAINING ALCOHOL: NEVER

## 2023-10-08 ASSESSMENT — PAIN - FUNCTIONAL ASSESSMENT: PAIN_FUNCTIONAL_ASSESSMENT: NONE - DENIES PAIN

## 2023-10-24 ENCOUNTER — HOSPITAL ENCOUNTER
Age: 23
Discharge: HOME | End: 2023-10-24
Payer: COMMERCIAL

## 2023-10-24 DIAGNOSIS — N92.6: Primary | ICD-10-CM

## 2023-10-24 LAB
ADD MANUAL DIFF: NO
HCT VFR BLD CALC: 36 % (ref 36–48)
HEMATOCRIT: 36 % (ref 36–48)
HEMOGLOBIN: 12.6 G/DL (ref 12–16)
IMMATURE GRANULOCYTES ABS AUTO: 0.02 10^3/UL (ref 0–0.03)
IMMATURE GRANULOCYTES PCT AUTO: 0.2 % (ref 0–0.5)
LYMPHOCYTES  ABSOLUTE AUTO: 2.2 10^3/UL (ref 1.2–3.8)
MCV RBC: 86.1 FL (ref 81–99)
MEAN CORPUSCULAR HEMOGLOBIN: 30.1 PG (ref 26.7–34)
MEAN CORPUSCULAR HGB CONC: 35 G/DL (ref 29.9–35.2)
MEAN CORPUSCULAR VOLUME: 86.1 FL (ref 81–99)
PLATELET # BLD: 199 10^3/UL (ref 150–450)
PLATELET COUNT: 199 10^3/UL (ref 150–450)
RED BLOOD COUNT: 4.18 10^6/UL (ref 4.2–5.4)
THYROID STIMULATING HORMONE: 7.07 UIU/ML (ref 0.36–3.74)
WBC # BLD: 9 10^3/UL (ref 4–11)
WHITE BLOOD COUNT: 9 10^3/UL (ref 4–11)

## 2023-10-24 PROCEDURE — 87389 HIV-1 AG W/HIV-1&-2 AB AG IA: CPT

## 2023-10-24 PROCEDURE — 86900 BLOOD TYPING SEROLOGIC ABO: CPT

## 2023-10-24 PROCEDURE — 87340 HEPATITIS B SURFACE AG IA: CPT

## 2023-10-24 PROCEDURE — 86850 RBC ANTIBODY SCREEN: CPT

## 2023-10-24 PROCEDURE — 86762 RUBELLA ANTIBODY: CPT

## 2023-10-24 PROCEDURE — 86803 HEPATITIS C AB TEST: CPT

## 2023-10-24 PROCEDURE — 86901 BLOOD TYPING SEROLOGIC RH(D): CPT

## 2023-10-24 PROCEDURE — 84443 ASSAY THYROID STIM HORMONE: CPT

## 2023-10-24 PROCEDURE — 87086 URINE CULTURE/COLONY COUNT: CPT

## 2023-10-24 PROCEDURE — 86592 SYPHILIS TEST NON-TREP QUAL: CPT

## 2023-10-24 PROCEDURE — 83036 HEMOGLOBIN GLYCOSYLATED A1C: CPT

## 2023-10-24 PROCEDURE — 36415 COLL VENOUS BLD VENIPUNCTURE: CPT

## 2023-10-24 PROCEDURE — 85025 COMPLETE CBC W/AUTO DIFF WBC: CPT

## 2023-10-25 LAB — RUBELLA ANTIBODIES, IGG: 7.11 INDEX

## 2023-11-21 ENCOUNTER — HOSPITAL ENCOUNTER (EMERGENCY)
Age: 23
Discharge: HOME | End: 2023-11-21
Attending: FAMILY MEDICINE
Payer: COMMERCIAL

## 2023-11-21 VITALS
TEMPERATURE: 98.2 F | RESPIRATION RATE: 20 BRPM | WEIGHT: 186.7 LBS | DIASTOLIC BLOOD PRESSURE: 70 MMHG | OXYGEN SATURATION: 97 % | HEART RATE: 118 BPM | SYSTOLIC BLOOD PRESSURE: 105 MMHG | BODY MASS INDEX: 31.11 KG/M2 | HEIGHT: 65 IN

## 2023-11-21 DIAGNOSIS — O21.0 HYPEREMESIS GRAVIDARUM: Primary | ICD-10-CM

## 2023-11-21 LAB
ALBUMIN SERPL-MCNC: 4.2 G/DL (ref 3.5–5.2)
ALP SERPL-CCNC: 48 U/L (ref 35–104)
ALT SERPL-CCNC: 12 U/L (ref 5–33)
ANION GAP SERPL CALCULATED.3IONS-SCNC: 15 MMOL/L (ref 9–17)
AST SERPL-CCNC: 11 U/L
BASOPHILS # BLD: 0.01 K/UL (ref 0–0.2)
BASOPHILS NFR BLD: 0 % (ref 0–2)
BILIRUB SERPL-MCNC: <0.1 MG/DL (ref 0.3–1.2)
BUN SERPL-MCNC: 7 MG/DL (ref 6–20)
BUN/CREAT SERPL: 14 (ref 9–20)
CALCIUM SERPL-MCNC: 9.7 MG/DL (ref 8.6–10.4)
CHLORIDE SERPL-SCNC: 101 MMOL/L (ref 98–107)
CO2 SERPL-SCNC: 22 MMOL/L (ref 20–31)
CREAT SERPL-MCNC: 0.5 MG/DL (ref 0.5–0.9)
EOSINOPHIL # BLD: 0 K/UL (ref 0–0.4)
EOSINOPHILS RELATIVE PERCENT: 0 % (ref 0–5)
ERYTHROCYTE [DISTWIDTH] IN BLOOD BY AUTOMATED COUNT: 11.9 % (ref 12.1–15.2)
GFR SERPL CREATININE-BSD FRML MDRD: >60 ML/MIN/1.73M2
GLUCOSE SERPL-MCNC: 90 MG/DL (ref 70–99)
HCT VFR BLD AUTO: 41.1 % (ref 36–46)
HGB BLD-MCNC: 14.5 G/DL (ref 12–16)
IMM GRANULOCYTES # BLD AUTO: 0.01 K/UL (ref 0–0.3)
IMM GRANULOCYTES NFR BLD: 0 % (ref 0–5)
LYMPHOCYTES NFR BLD: 1.74 K/UL (ref 1–4.8)
LYMPHOCYTES RELATIVE PERCENT: 21 % (ref 15–40)
MCH RBC QN AUTO: 29.2 PG (ref 26–34)
MCHC RBC AUTO-ENTMCNC: 35.3 G/DL (ref 31–37)
MCV RBC AUTO: 82.9 FL (ref 80–100)
MONOCYTES NFR BLD: 0.5 K/UL (ref 0–1)
MONOCYTES NFR BLD: 6 % (ref 4–8)
NEUTROPHILS NFR BLD: 73 % (ref 47–75)
NEUTS SEG NFR BLD: 6.24 K/UL (ref 2.5–7)
PLATELET # BLD AUTO: 227 K/UL (ref 140–450)
PMV BLD AUTO: 10.4 FL (ref 6–12)
POTASSIUM SERPL-SCNC: 3.5 MMOL/L (ref 3.7–5.3)
PROT SERPL-MCNC: 7.5 G/DL (ref 6.4–8.3)
RBC # BLD AUTO: 4.96 M/UL (ref 4–5.2)
SODIUM SERPL-SCNC: 138 MMOL/L (ref 135–144)
WBC OTHER # BLD: 8.5 K/UL (ref 3.5–11)

## 2023-11-21 PROCEDURE — 85025 COMPLETE CBC W/AUTO DIFF WBC: CPT

## 2023-11-21 PROCEDURE — 99284 EMERGENCY DEPT VISIT MOD MDM: CPT

## 2023-11-21 PROCEDURE — 2580000003 HC RX 258: Performed by: FAMILY MEDICINE

## 2023-11-21 PROCEDURE — 96374 THER/PROPH/DIAG INJ IV PUSH: CPT

## 2023-11-21 PROCEDURE — 6360000002 HC RX W HCPCS: Performed by: FAMILY MEDICINE

## 2023-11-21 PROCEDURE — 80053 COMPREHEN METABOLIC PANEL: CPT

## 2023-11-21 RX ORDER — 0.9 % SODIUM CHLORIDE 0.9 %
1000 INTRAVENOUS SOLUTION INTRAVENOUS ONCE
Status: COMPLETED | OUTPATIENT
Start: 2023-11-21 | End: 2023-11-21

## 2023-11-21 RX ORDER — METOCLOPRAMIDE 10 MG/1
10 TABLET ORAL
COMMUNITY

## 2023-11-21 RX ORDER — LEVOTHYROXINE SODIUM 0.03 MG/1
25 TABLET ORAL DAILY
COMMUNITY

## 2023-11-21 RX ORDER — SODIUM CHLORIDE, SODIUM LACTATE, POTASSIUM CHLORIDE, CALCIUM CHLORIDE 600; 310; 30; 20 MG/100ML; MG/100ML; MG/100ML; MG/100ML
1000 INJECTION, SOLUTION INTRAVENOUS ONCE
Status: COMPLETED | OUTPATIENT
Start: 2023-11-21 | End: 2023-11-21

## 2023-11-21 RX ORDER — ONDANSETRON 2 MG/ML
4 INJECTION INTRAMUSCULAR; INTRAVENOUS ONCE
Status: COMPLETED | OUTPATIENT
Start: 2023-11-21 | End: 2023-11-21

## 2023-11-21 RX ADMIN — SODIUM CHLORIDE 1000 ML: 9 INJECTION, SOLUTION INTRAVENOUS at 21:00

## 2023-11-21 RX ADMIN — ONDANSETRON 4 MG: 2 INJECTION INTRAMUSCULAR; INTRAVENOUS at 21:00

## 2023-11-21 RX ADMIN — SODIUM CHLORIDE, POTASSIUM CHLORIDE, SODIUM LACTATE AND CALCIUM CHLORIDE 1000 ML: 600; 310; 30; 20 INJECTION, SOLUTION INTRAVENOUS at 22:20

## 2023-11-22 ASSESSMENT — ENCOUNTER SYMPTOMS
DIARRHEA: 0
NAUSEA: 1
VOMITING: 1

## 2023-11-22 NOTE — ED PROVIDER NOTES
[address]      Pt Name: Marylene Kinder  MRN: [id number]  [facility] 2000  Date of evaluation: 11/21/2023  Provider: Osmar Servin MD    CHIEF COMPLAINT       Chief Complaint   Patient presents with    Emesis     Vomiting x 3 days. Pt reports unable to keep anything down. 14 weeks pregnant. HISTORY OF PRESENT ILLNESS      Marylene Kinder is a 23 y.o. female who presents to the emergency department via private vehicle with friend, patient planing of nausea vomiting for the past 3 days, denies any diarrhea denies any fever denies any dysuria, patient states she is currently 14 weeks pregnant. Patient states they have been discussing possible Zofran pump due to her repeat episodes of vomiting in pregnancy. OB: Dr. Imer Black @ 14w3d by JULISA 5/18/2024        REVIEW OF SYSTEMS       Review of Systems   Gastrointestinal:  Positive for nausea and vomiting. Negative for diarrhea. Genitourinary:  Negative for dysuria, vaginal bleeding and vaginal discharge. All other systems reviewed and are negative. PAST MEDICAL HISTORY     Past Medical History:   Diagnosis Date    Irregular menstrual bleeding          SURGICAL HISTORY       Past Surgical History:   Procedure Laterality Date    TEAR DUCT SURGERY      childhood         CURRENT MEDICATIONS       Discharge Medication List as of 11/21/2023 10:45 PM        CONTINUE these medications which have NOT CHANGED    Details   levothyroxine (SYNTHROID) 25 MCG tablet Take 1 tablet by mouth DailyHistorical Med      metoclopramide (REGLAN) 10 MG tablet Take 1 tablet by mouth 3 times daily (with meals)Historical Med      Prenatal Vit-Fe Fumarate-FA (PRENATAL PO) Take by mouthHistorical Med             ALLERGIES       Patient has no known allergies.     FAMILY HISTORY       Family History   Problem Relation Age of Onset    High Blood Pressure Mother     Other Mother         hiatal hernia    High Blood Pressure Father

## 2023-12-06 ENCOUNTER — HOSPITAL ENCOUNTER (OUTPATIENT)
Dept: HOSPITAL 101 - LAB | Age: 23
Discharge: HOME | End: 2023-12-06
Payer: COMMERCIAL

## 2023-12-06 DIAGNOSIS — Z01.419: Primary | ICD-10-CM

## 2023-12-06 PROCEDURE — 87624 HPV HI-RISK TYP POOLED RSLT: CPT

## 2023-12-06 PROCEDURE — G0145 SCR C/V CYTO,THINLAYER,RESCR: HCPCS

## 2023-12-12 ENCOUNTER — HOSPITAL ENCOUNTER
Dept: HOSPITAL 101 - LAB | Age: 23
Discharge: HOME | End: 2023-12-12
Payer: COMMERCIAL

## 2023-12-12 DIAGNOSIS — R79.89: Primary | ICD-10-CM

## 2023-12-12 LAB — THYROID STIMULATING HORMONE: 4.1 UIU/ML (ref 0.36–3.74)

## 2023-12-12 PROCEDURE — 84443 ASSAY THYROID STIM HORMONE: CPT

## 2023-12-12 PROCEDURE — 36415 COLL VENOUS BLD VENIPUNCTURE: CPT

## 2024-01-03 ENCOUNTER — HOSPITAL ENCOUNTER
Age: 24
Discharge: HOME | End: 2024-01-03
Payer: COMMERCIAL

## 2024-01-03 DIAGNOSIS — Z3A.20: ICD-10-CM

## 2024-01-03 DIAGNOSIS — Z34.92: Primary | ICD-10-CM

## 2024-01-03 PROCEDURE — 76817 TRANSVAGINAL US OBSTETRIC: CPT

## 2024-01-03 PROCEDURE — 76805 OB US >/= 14 WKS SNGL FETUS: CPT

## 2024-01-22 ENCOUNTER — HOSPITAL ENCOUNTER
Dept: HOSPITAL 101 - LAB | Age: 24
Discharge: HOME | End: 2024-01-22
Payer: COMMERCIAL

## 2024-01-22 DIAGNOSIS — R79.89: ICD-10-CM

## 2024-01-22 DIAGNOSIS — Z34.92: Primary | ICD-10-CM

## 2024-01-22 LAB — THYROID STIMULATING HORMONE: 5.83 UIU/ML (ref 0.36–3.74)

## 2024-01-22 PROCEDURE — 82105 ALPHA-FETOPROTEIN SERUM: CPT

## 2024-01-22 PROCEDURE — 84443 ASSAY THYROID STIM HORMONE: CPT

## 2024-01-22 PROCEDURE — 36415 COLL VENOUS BLD VENIPUNCTURE: CPT

## 2024-02-06 ENCOUNTER — HOSPITAL ENCOUNTER
Dept: HOSPITAL 101 - NOMS | Age: 24
Discharge: HOME | End: 2024-02-06
Payer: COMMERCIAL

## 2024-02-06 DIAGNOSIS — Z3A.25: ICD-10-CM

## 2024-02-06 DIAGNOSIS — Z34.92: Primary | ICD-10-CM

## 2024-02-06 PROCEDURE — 76815 OB US LIMITED FETUS(S): CPT

## 2024-02-19 ENCOUNTER — HOSPITAL ENCOUNTER
Dept: HOSPITAL 101 - LAB | Age: 24
Discharge: HOME | End: 2024-02-19
Payer: COMMERCIAL

## 2024-02-19 DIAGNOSIS — Z13.1: Primary | ICD-10-CM

## 2024-02-19 LAB
ADD MANUAL DIFF: NO
GLUCOSE 1 HOUR: 141 MG/DL (ref ?–130)
HCT VFR BLD CALC: 32.9 % (ref 36–48)
HEMATOCRIT: 32.9 % (ref 36–48)
HEMOGLOBIN: 11 G/DL (ref 12–16)
IMMATURE GRANULOCYTES ABS AUTO: 0.08 10^3/UL (ref 0–0.03)
IMMATURE GRANULOCYTES PCT AUTO: 0.7 % (ref 0–0.5)
LYMPHOCYTES  ABSOLUTE AUTO: 2 10^3/UL (ref 1.2–3.8)
MCV RBC: 90.1 FL (ref 81–99)
MEAN CORPUSCULAR HEMOGLOBIN: 30.1 PG (ref 26.7–34)
MEAN CORPUSCULAR HGB CONC: 33.4 G/DL (ref 29.9–35.2)
MEAN CORPUSCULAR VOLUME: 90.1 FL (ref 81–99)
PLATELET # BLD: 230 10^3/UL (ref 150–450)
PLATELET COUNT: 230 10^3/UL (ref 150–450)
RED BLOOD COUNT: 3.65 10^6/UL (ref 4.2–5.4)
WBC # BLD: 11.4 10^3/UL (ref 4–11)
WHITE BLOOD COUNT: 11.4 10^3/UL (ref 4–11)

## 2024-02-19 PROCEDURE — 85025 COMPLETE CBC W/AUTO DIFF WBC: CPT

## 2024-02-19 PROCEDURE — 36415 COLL VENOUS BLD VENIPUNCTURE: CPT

## 2024-02-19 PROCEDURE — 82950 GLUCOSE TEST: CPT

## 2024-03-01 ENCOUNTER — HOSPITAL ENCOUNTER
Dept: HOSPITAL 101 - LAB | Age: 24
Discharge: HOME | End: 2024-03-01
Payer: COMMERCIAL

## 2024-03-01 DIAGNOSIS — R73.09: Primary | ICD-10-CM

## 2024-03-01 LAB
GLUCOSE 1 HOUR: 101 MG/DL (ref ?–180)
GLUCOSE 2 HOUR: 116 MG/DL (ref ?–155)
GLUCOSE 3 HOUR: 121 MG/DL (ref ?–140)

## 2024-03-01 PROCEDURE — 36415 COLL VENOUS BLD VENIPUNCTURE: CPT

## 2024-03-01 PROCEDURE — 82952 GTT-ADDED SAMPLES: CPT

## 2024-03-01 PROCEDURE — 82951 GLUCOSE TOLERANCE TEST (GTT): CPT

## 2024-03-14 ENCOUNTER — HOSPITAL ENCOUNTER
Dept: HOSPITAL 101 - LAB | Age: 24
Discharge: HOME | End: 2024-03-14
Payer: COMMERCIAL

## 2024-03-14 DIAGNOSIS — E03.8: Primary | ICD-10-CM

## 2024-03-14 LAB — THYROID STIMULATING HORMONE: 2.14 UIU/ML (ref 0.36–3.74)

## 2024-03-14 PROCEDURE — 36415 COLL VENOUS BLD VENIPUNCTURE: CPT

## 2024-03-14 PROCEDURE — 84443 ASSAY THYROID STIM HORMONE: CPT

## 2024-03-16 ENCOUNTER — HOSPITAL ENCOUNTER
Dept: HOSPITAL 101 - US | Age: 24
Discharge: HOME | End: 2024-03-16
Payer: COMMERCIAL

## 2024-03-16 VITALS — DIASTOLIC BLOOD PRESSURE: 71 MMHG | HEART RATE: 115 BPM | SYSTOLIC BLOOD PRESSURE: 127 MMHG

## 2024-03-16 DIAGNOSIS — E05.90: ICD-10-CM

## 2024-03-16 DIAGNOSIS — Z3A.31: ICD-10-CM

## 2024-03-16 DIAGNOSIS — O99.280: Primary | ICD-10-CM

## 2024-03-16 PROCEDURE — 76818 FETAL BIOPHYS PROFILE W/NST: CPT

## 2024-03-20 ENCOUNTER — HOSPITAL ENCOUNTER
Dept: HOSPITAL 101 - FBCO | Age: 24
Discharge: HOME | End: 2024-03-20
Payer: COMMERCIAL

## 2024-03-20 VITALS — SYSTOLIC BLOOD PRESSURE: 115 MMHG | HEART RATE: 97 BPM | DIASTOLIC BLOOD PRESSURE: 71 MMHG

## 2024-03-20 VITALS — DIASTOLIC BLOOD PRESSURE: 71 MMHG | HEART RATE: 96 BPM | SYSTOLIC BLOOD PRESSURE: 112 MMHG

## 2024-03-20 DIAGNOSIS — O26.893: Primary | ICD-10-CM

## 2024-03-20 PROCEDURE — 59025 FETAL NON-STRESS TEST: CPT

## 2024-03-23 ENCOUNTER — HOSPITAL ENCOUNTER
Dept: HOSPITAL 101 - US | Age: 24
Discharge: HOME | End: 2024-03-23
Payer: COMMERCIAL

## 2024-03-23 VITALS — DIASTOLIC BLOOD PRESSURE: 62 MMHG | HEART RATE: 85 BPM | SYSTOLIC BLOOD PRESSURE: 132 MMHG

## 2024-03-23 DIAGNOSIS — E05.90: ICD-10-CM

## 2024-03-23 DIAGNOSIS — O99.280: Primary | ICD-10-CM

## 2024-03-23 DIAGNOSIS — Z3A.32: ICD-10-CM

## 2024-03-23 PROCEDURE — 76818 FETAL BIOPHYS PROFILE W/NST: CPT

## 2024-03-27 ENCOUNTER — HOSPITAL ENCOUNTER
Dept: HOSPITAL 101 - FBCO | Age: 24
Discharge: HOME | End: 2024-03-27
Payer: COMMERCIAL

## 2024-03-27 VITALS — DIASTOLIC BLOOD PRESSURE: 60 MMHG | SYSTOLIC BLOOD PRESSURE: 120 MMHG | HEART RATE: 94 BPM

## 2024-03-27 DIAGNOSIS — O99.283: Primary | ICD-10-CM

## 2024-03-27 PROCEDURE — 59025 FETAL NON-STRESS TEST: CPT

## 2024-03-30 ENCOUNTER — HOSPITAL ENCOUNTER
Dept: HOSPITAL 101 - US | Age: 24
Discharge: HOME | End: 2024-03-30
Payer: COMMERCIAL

## 2024-03-30 VITALS — HEART RATE: 102 BPM | SYSTOLIC BLOOD PRESSURE: 111 MMHG | DIASTOLIC BLOOD PRESSURE: 64 MMHG

## 2024-03-30 DIAGNOSIS — O99.280: Primary | ICD-10-CM

## 2024-03-30 DIAGNOSIS — Z3A.33: ICD-10-CM

## 2024-03-30 DIAGNOSIS — E05.90: ICD-10-CM

## 2024-03-30 PROCEDURE — 76818 FETAL BIOPHYS PROFILE W/NST: CPT

## 2024-04-01 ENCOUNTER — HOSPITAL ENCOUNTER
Dept: HOSPITAL 101 - NOMS | Age: 24
Discharge: HOME | End: 2024-04-01
Payer: COMMERCIAL

## 2024-04-01 DIAGNOSIS — Z3A.34: ICD-10-CM

## 2024-04-01 DIAGNOSIS — O36.63X0: Primary | ICD-10-CM

## 2024-04-01 PROCEDURE — 76816 OB US FOLLOW-UP PER FETUS: CPT

## 2024-04-03 ENCOUNTER — HOSPITAL ENCOUNTER
Dept: HOSPITAL 101 - FBCO | Age: 24
Discharge: HOME | End: 2024-04-03
Payer: COMMERCIAL

## 2024-04-03 VITALS — DIASTOLIC BLOOD PRESSURE: 66 MMHG | HEART RATE: 86 BPM | SYSTOLIC BLOOD PRESSURE: 114 MMHG

## 2024-04-03 DIAGNOSIS — O26.893: Primary | ICD-10-CM

## 2024-04-03 PROCEDURE — 59025 FETAL NON-STRESS TEST: CPT

## 2024-04-06 ENCOUNTER — HOSPITAL ENCOUNTER
Dept: HOSPITAL 101 - US | Age: 24
Discharge: HOME | End: 2024-04-06
Payer: COMMERCIAL

## 2024-04-06 VITALS — HEART RATE: 95 BPM | DIASTOLIC BLOOD PRESSURE: 68 MMHG | SYSTOLIC BLOOD PRESSURE: 130 MMHG

## 2024-04-06 DIAGNOSIS — Z3A.34: ICD-10-CM

## 2024-04-06 DIAGNOSIS — O99.280: Primary | ICD-10-CM

## 2024-04-06 DIAGNOSIS — E03.9: ICD-10-CM

## 2024-04-06 PROCEDURE — 76818 FETAL BIOPHYS PROFILE W/NST: CPT

## 2024-04-10 ENCOUNTER — HOSPITAL ENCOUNTER
Dept: HOSPITAL 101 - FBCO | Age: 24
Discharge: HOME | End: 2024-04-10
Payer: COMMERCIAL

## 2024-04-10 VITALS — SYSTOLIC BLOOD PRESSURE: 122 MMHG | HEART RATE: 112 BPM | DIASTOLIC BLOOD PRESSURE: 76 MMHG

## 2024-04-10 DIAGNOSIS — Z3A.34: ICD-10-CM

## 2024-04-10 DIAGNOSIS — O99.283: Primary | ICD-10-CM

## 2024-04-10 DIAGNOSIS — E05.90: ICD-10-CM

## 2024-04-10 PROCEDURE — 59025 FETAL NON-STRESS TEST: CPT

## 2024-04-13 ENCOUNTER — HOSPITAL ENCOUNTER
Dept: HOSPITAL 101 - US | Age: 24
Discharge: HOME | End: 2024-04-13
Payer: COMMERCIAL

## 2024-04-13 VITALS — HEART RATE: 110 BPM | DIASTOLIC BLOOD PRESSURE: 77 MMHG | SYSTOLIC BLOOD PRESSURE: 114 MMHG

## 2024-04-13 DIAGNOSIS — Z3A.35: ICD-10-CM

## 2024-04-13 DIAGNOSIS — E05.90: ICD-10-CM

## 2024-04-13 DIAGNOSIS — O99.280: Primary | ICD-10-CM

## 2024-04-13 PROCEDURE — 76818 FETAL BIOPHYS PROFILE W/NST: CPT

## 2024-04-17 ENCOUNTER — HOSPITAL ENCOUNTER
Dept: HOSPITAL 101 - FBCO | Age: 24
Discharge: HOME | End: 2024-04-17
Payer: COMMERCIAL

## 2024-04-17 VITALS — SYSTOLIC BLOOD PRESSURE: 116 MMHG | HEART RATE: 98 BPM | DIASTOLIC BLOOD PRESSURE: 76 MMHG

## 2024-04-17 DIAGNOSIS — Z3A.35: ICD-10-CM

## 2024-04-17 DIAGNOSIS — E05.90: ICD-10-CM

## 2024-04-17 DIAGNOSIS — O99.283: Primary | ICD-10-CM

## 2024-04-17 PROCEDURE — 59025 FETAL NON-STRESS TEST: CPT

## 2024-04-20 ENCOUNTER — HOSPITAL ENCOUNTER
Dept: HOSPITAL 101 - US | Age: 24
Discharge: HOME | End: 2024-04-20
Payer: COMMERCIAL

## 2024-04-20 VITALS — DIASTOLIC BLOOD PRESSURE: 67 MMHG | HEART RATE: 89 BPM | SYSTOLIC BLOOD PRESSURE: 127 MMHG

## 2024-04-20 DIAGNOSIS — E05.90: ICD-10-CM

## 2024-04-20 DIAGNOSIS — O99.283: Primary | ICD-10-CM

## 2024-04-20 DIAGNOSIS — Z3A.36: ICD-10-CM

## 2024-04-20 PROCEDURE — 76818 FETAL BIOPHYS PROFILE W/NST: CPT

## 2024-04-23 ENCOUNTER — HOSPITAL ENCOUNTER (OUTPATIENT)
Dept: HOSPITAL 101 - LAB | Age: 24
Discharge: HOME | End: 2024-04-23
Payer: COMMERCIAL

## 2024-04-23 DIAGNOSIS — Z34.93: Primary | ICD-10-CM

## 2024-04-23 PROCEDURE — 87081 CULTURE SCREEN ONLY: CPT

## 2024-04-24 ENCOUNTER — HOSPITAL ENCOUNTER
Dept: HOSPITAL 101 - FBCO | Age: 24
Discharge: HOME | End: 2024-04-24
Payer: COMMERCIAL

## 2024-04-24 VITALS — DIASTOLIC BLOOD PRESSURE: 58 MMHG | SYSTOLIC BLOOD PRESSURE: 123 MMHG | HEART RATE: 85 BPM

## 2024-04-24 DIAGNOSIS — O99.283: Primary | ICD-10-CM

## 2024-04-24 PROCEDURE — 59025 FETAL NON-STRESS TEST: CPT

## 2024-04-27 ENCOUNTER — HOSPITAL ENCOUNTER
Dept: HOSPITAL 101 - US | Age: 24
Discharge: HOME | End: 2024-04-27
Payer: COMMERCIAL

## 2024-04-27 VITALS — DIASTOLIC BLOOD PRESSURE: 66 MMHG | SYSTOLIC BLOOD PRESSURE: 130 MMHG | HEART RATE: 92 BPM

## 2024-04-27 DIAGNOSIS — E05.90: ICD-10-CM

## 2024-04-27 DIAGNOSIS — Z3A.37: ICD-10-CM

## 2024-04-27 DIAGNOSIS — O99.280: Primary | ICD-10-CM

## 2024-04-27 PROCEDURE — 76818 FETAL BIOPHYS PROFILE W/NST: CPT

## 2024-04-29 ENCOUNTER — HOSPITAL ENCOUNTER
Dept: HOSPITAL 101 - NOMS | Age: 24
Discharge: HOME | End: 2024-04-29
Payer: COMMERCIAL

## 2024-04-29 DIAGNOSIS — Z3A.36: ICD-10-CM

## 2024-04-29 DIAGNOSIS — O36.63X0: Primary | ICD-10-CM

## 2024-04-29 PROCEDURE — 76816 OB US FOLLOW-UP PER FETUS: CPT

## 2024-05-01 ENCOUNTER — HOSPITAL ENCOUNTER
Dept: HOSPITAL 101 - FBCO | Age: 24
Discharge: HOME | End: 2024-05-01
Payer: COMMERCIAL

## 2024-05-01 VITALS — SYSTOLIC BLOOD PRESSURE: 137 MMHG | HEART RATE: 123 BPM | DIASTOLIC BLOOD PRESSURE: 79 MMHG

## 2024-05-01 VITALS — HEART RATE: 91 BPM | TEMPERATURE: 97.52 F | SYSTOLIC BLOOD PRESSURE: 126 MMHG | DIASTOLIC BLOOD PRESSURE: 75 MMHG

## 2024-05-01 DIAGNOSIS — O26.893: Primary | ICD-10-CM

## 2024-05-01 PROCEDURE — 59025 FETAL NON-STRESS TEST: CPT

## 2024-05-04 ENCOUNTER — HOSPITAL ENCOUNTER
Dept: HOSPITAL 101 - US | Age: 24
Discharge: HOME | End: 2024-05-04
Payer: COMMERCIAL

## 2024-05-04 VITALS — SYSTOLIC BLOOD PRESSURE: 120 MMHG | DIASTOLIC BLOOD PRESSURE: 83 MMHG | HEART RATE: 96 BPM

## 2024-05-04 DIAGNOSIS — E05.90: ICD-10-CM

## 2024-05-04 DIAGNOSIS — O99.280: Primary | ICD-10-CM

## 2024-05-04 DIAGNOSIS — Z3A.38: ICD-10-CM

## 2024-05-04 PROCEDURE — 76818 FETAL BIOPHYS PROFILE W/NST: CPT

## 2024-05-07 ENCOUNTER — HOSPITAL ENCOUNTER
Dept: HOSPITAL 101 - LAB | Age: 24
Discharge: HOME | End: 2024-05-07
Payer: COMMERCIAL

## 2024-05-07 DIAGNOSIS — E03.8: Primary | ICD-10-CM

## 2024-05-07 LAB — THYROID STIMULATING HORMONE: 2.12 UIU/ML (ref 0.36–3.74)

## 2024-05-07 PROCEDURE — 36415 COLL VENOUS BLD VENIPUNCTURE: CPT

## 2024-05-07 PROCEDURE — 84443 ASSAY THYROID STIM HORMONE: CPT

## 2024-05-08 ENCOUNTER — HOSPITAL ENCOUNTER
Dept: HOSPITAL 101 - FBCO | Age: 24
Discharge: HOME | End: 2024-05-08
Payer: COMMERCIAL

## 2024-05-08 VITALS — HEART RATE: 90 BPM | DIASTOLIC BLOOD PRESSURE: 71 MMHG | SYSTOLIC BLOOD PRESSURE: 128 MMHG

## 2024-05-08 DIAGNOSIS — O99.283: Primary | ICD-10-CM

## 2024-05-08 PROCEDURE — 59025 FETAL NON-STRESS TEST: CPT

## 2024-05-10 ENCOUNTER — HOSPITAL ENCOUNTER (INPATIENT)
Age: 24
LOS: 1 days | Discharge: HOME | End: 2024-05-11
Payer: COMMERCIAL

## 2024-05-10 VITALS — DIASTOLIC BLOOD PRESSURE: 68 MMHG | SYSTOLIC BLOOD PRESSURE: 117 MMHG | HEART RATE: 88 BPM

## 2024-05-10 VITALS — DIASTOLIC BLOOD PRESSURE: 57 MMHG | HEART RATE: 99 BPM | SYSTOLIC BLOOD PRESSURE: 126 MMHG

## 2024-05-10 VITALS — SYSTOLIC BLOOD PRESSURE: 128 MMHG | DIASTOLIC BLOOD PRESSURE: 74 MMHG | HEART RATE: 83 BPM

## 2024-05-10 VITALS — HEART RATE: 89 BPM | SYSTOLIC BLOOD PRESSURE: 117 MMHG | DIASTOLIC BLOOD PRESSURE: 56 MMHG

## 2024-05-10 VITALS — SYSTOLIC BLOOD PRESSURE: 124 MMHG | HEART RATE: 108 BPM | DIASTOLIC BLOOD PRESSURE: 62 MMHG

## 2024-05-10 VITALS — TEMPERATURE: 98.78 F | SYSTOLIC BLOOD PRESSURE: 130 MMHG | DIASTOLIC BLOOD PRESSURE: 63 MMHG | HEART RATE: 96 BPM

## 2024-05-10 VITALS — SYSTOLIC BLOOD PRESSURE: 118 MMHG | HEART RATE: 106 BPM | DIASTOLIC BLOOD PRESSURE: 60 MMHG

## 2024-05-10 VITALS — SYSTOLIC BLOOD PRESSURE: 135 MMHG | DIASTOLIC BLOOD PRESSURE: 69 MMHG | HEART RATE: 112 BPM

## 2024-05-10 VITALS — SYSTOLIC BLOOD PRESSURE: 139 MMHG | DIASTOLIC BLOOD PRESSURE: 64 MMHG | HEART RATE: 96 BPM

## 2024-05-10 VITALS — DIASTOLIC BLOOD PRESSURE: 71 MMHG | HEART RATE: 93 BPM | SYSTOLIC BLOOD PRESSURE: 133 MMHG

## 2024-05-10 VITALS — SYSTOLIC BLOOD PRESSURE: 132 MMHG | HEART RATE: 107 BPM | DIASTOLIC BLOOD PRESSURE: 63 MMHG

## 2024-05-10 VITALS — SYSTOLIC BLOOD PRESSURE: 102 MMHG | DIASTOLIC BLOOD PRESSURE: 57 MMHG | HEART RATE: 85 BPM

## 2024-05-10 VITALS — HEART RATE: 82 BPM | DIASTOLIC BLOOD PRESSURE: 72 MMHG | SYSTOLIC BLOOD PRESSURE: 125 MMHG

## 2024-05-10 VITALS — DIASTOLIC BLOOD PRESSURE: 52 MMHG | SYSTOLIC BLOOD PRESSURE: 96 MMHG | HEART RATE: 87 BPM

## 2024-05-10 VITALS — HEART RATE: 95 BPM | DIASTOLIC BLOOD PRESSURE: 77 MMHG | SYSTOLIC BLOOD PRESSURE: 136 MMHG

## 2024-05-10 VITALS — SYSTOLIC BLOOD PRESSURE: 120 MMHG | HEART RATE: 88 BPM | DIASTOLIC BLOOD PRESSURE: 73 MMHG

## 2024-05-10 VITALS — DIASTOLIC BLOOD PRESSURE: 107 MMHG | HEART RATE: 131 BPM | SYSTOLIC BLOOD PRESSURE: 168 MMHG

## 2024-05-10 VITALS — HEART RATE: 81 BPM | DIASTOLIC BLOOD PRESSURE: 65 MMHG | SYSTOLIC BLOOD PRESSURE: 114 MMHG

## 2024-05-10 VITALS — SYSTOLIC BLOOD PRESSURE: 134 MMHG | HEART RATE: 90 BPM | DIASTOLIC BLOOD PRESSURE: 63 MMHG

## 2024-05-10 VITALS — HEART RATE: 92 BPM | DIASTOLIC BLOOD PRESSURE: 72 MMHG | SYSTOLIC BLOOD PRESSURE: 116 MMHG

## 2024-05-10 VITALS — DIASTOLIC BLOOD PRESSURE: 56 MMHG | SYSTOLIC BLOOD PRESSURE: 100 MMHG | HEART RATE: 87 BPM

## 2024-05-10 VITALS — HEART RATE: 82 BPM | DIASTOLIC BLOOD PRESSURE: 69 MMHG | SYSTOLIC BLOOD PRESSURE: 128 MMHG

## 2024-05-10 VITALS — HEART RATE: 115 BPM | SYSTOLIC BLOOD PRESSURE: 113 MMHG | DIASTOLIC BLOOD PRESSURE: 67 MMHG

## 2024-05-10 VITALS — HEART RATE: 90 BPM | DIASTOLIC BLOOD PRESSURE: 57 MMHG | SYSTOLIC BLOOD PRESSURE: 101 MMHG

## 2024-05-10 VITALS — DIASTOLIC BLOOD PRESSURE: 64 MMHG | HEART RATE: 86 BPM | SYSTOLIC BLOOD PRESSURE: 112 MMHG

## 2024-05-10 VITALS — HEART RATE: 82 BPM | DIASTOLIC BLOOD PRESSURE: 73 MMHG | SYSTOLIC BLOOD PRESSURE: 116 MMHG

## 2024-05-10 VITALS — DIASTOLIC BLOOD PRESSURE: 63 MMHG | HEART RATE: 102 BPM | SYSTOLIC BLOOD PRESSURE: 123 MMHG

## 2024-05-10 VITALS — SYSTOLIC BLOOD PRESSURE: 119 MMHG | HEART RATE: 90 BPM | DIASTOLIC BLOOD PRESSURE: 61 MMHG

## 2024-05-10 VITALS — SYSTOLIC BLOOD PRESSURE: 136 MMHG | HEART RATE: 117 BPM | DIASTOLIC BLOOD PRESSURE: 61 MMHG

## 2024-05-10 VITALS — HEART RATE: 85 BPM | SYSTOLIC BLOOD PRESSURE: 116 MMHG | DIASTOLIC BLOOD PRESSURE: 70 MMHG

## 2024-05-10 VITALS — HEART RATE: 93 BPM | DIASTOLIC BLOOD PRESSURE: 71 MMHG | SYSTOLIC BLOOD PRESSURE: 126 MMHG

## 2024-05-10 VITALS — HEART RATE: 85 BPM | SYSTOLIC BLOOD PRESSURE: 126 MMHG | DIASTOLIC BLOOD PRESSURE: 76 MMHG

## 2024-05-10 VITALS — DIASTOLIC BLOOD PRESSURE: 59 MMHG | SYSTOLIC BLOOD PRESSURE: 122 MMHG | HEART RATE: 100 BPM

## 2024-05-10 VITALS — HEART RATE: 92 BPM | SYSTOLIC BLOOD PRESSURE: 139 MMHG | DIASTOLIC BLOOD PRESSURE: 80 MMHG

## 2024-05-10 VITALS — SYSTOLIC BLOOD PRESSURE: 117 MMHG | HEART RATE: 80 BPM | DIASTOLIC BLOOD PRESSURE: 66 MMHG

## 2024-05-10 VITALS — DIASTOLIC BLOOD PRESSURE: 70 MMHG | SYSTOLIC BLOOD PRESSURE: 118 MMHG | HEART RATE: 92 BPM

## 2024-05-10 VITALS — DIASTOLIC BLOOD PRESSURE: 79 MMHG | SYSTOLIC BLOOD PRESSURE: 137 MMHG | HEART RATE: 103 BPM

## 2024-05-10 VITALS — DIASTOLIC BLOOD PRESSURE: 74 MMHG | HEART RATE: 84 BPM | SYSTOLIC BLOOD PRESSURE: 128 MMHG

## 2024-05-10 VITALS — SYSTOLIC BLOOD PRESSURE: 132 MMHG | HEART RATE: 82 BPM | DIASTOLIC BLOOD PRESSURE: 82 MMHG

## 2024-05-10 VITALS — DIASTOLIC BLOOD PRESSURE: 65 MMHG | HEART RATE: 80 BPM | SYSTOLIC BLOOD PRESSURE: 119 MMHG

## 2024-05-10 VITALS — SYSTOLIC BLOOD PRESSURE: 132 MMHG | HEART RATE: 89 BPM | DIASTOLIC BLOOD PRESSURE: 76 MMHG

## 2024-05-10 VITALS — HEART RATE: 85 BPM | DIASTOLIC BLOOD PRESSURE: 56 MMHG | SYSTOLIC BLOOD PRESSURE: 100 MMHG

## 2024-05-10 VITALS — DIASTOLIC BLOOD PRESSURE: 71 MMHG | SYSTOLIC BLOOD PRESSURE: 133 MMHG | HEART RATE: 93 BPM

## 2024-05-10 VITALS — DIASTOLIC BLOOD PRESSURE: 70 MMHG | SYSTOLIC BLOOD PRESSURE: 123 MMHG | HEART RATE: 93 BPM

## 2024-05-10 VITALS — HEART RATE: 99 BPM | SYSTOLIC BLOOD PRESSURE: 144 MMHG | DIASTOLIC BLOOD PRESSURE: 70 MMHG

## 2024-05-10 VITALS — SYSTOLIC BLOOD PRESSURE: 134 MMHG | DIASTOLIC BLOOD PRESSURE: 71 MMHG | HEART RATE: 85 BPM

## 2024-05-10 VITALS — DIASTOLIC BLOOD PRESSURE: 74 MMHG | HEART RATE: 93 BPM | SYSTOLIC BLOOD PRESSURE: 134 MMHG

## 2024-05-10 VITALS — HEART RATE: 98 BPM | SYSTOLIC BLOOD PRESSURE: 136 MMHG | DIASTOLIC BLOOD PRESSURE: 74 MMHG

## 2024-05-10 VITALS — SYSTOLIC BLOOD PRESSURE: 130 MMHG | HEART RATE: 107 BPM | DIASTOLIC BLOOD PRESSURE: 73 MMHG

## 2024-05-10 VITALS — TEMPERATURE: 98.24 F

## 2024-05-10 VITALS — SYSTOLIC BLOOD PRESSURE: 115 MMHG | HEART RATE: 80 BPM | DIASTOLIC BLOOD PRESSURE: 73 MMHG

## 2024-05-10 VITALS — SYSTOLIC BLOOD PRESSURE: 122 MMHG | HEART RATE: 96 BPM | DIASTOLIC BLOOD PRESSURE: 79 MMHG

## 2024-05-10 VITALS — DIASTOLIC BLOOD PRESSURE: 66 MMHG | SYSTOLIC BLOOD PRESSURE: 130 MMHG | HEART RATE: 91 BPM

## 2024-05-10 DIAGNOSIS — E03.9: ICD-10-CM

## 2024-05-10 DIAGNOSIS — Z87.440: ICD-10-CM

## 2024-05-10 DIAGNOSIS — Z3A.39: ICD-10-CM

## 2024-05-10 LAB
CANNABINOID SCREEN URINE: NEGATIVE
HEMATOCRIT: 37.1 % (ref 36–48)
HEMOGLOBIN: 11.9 G/DL (ref 12–16)
MCV RBC: 84.9 FL (ref 81–99)
MEAN CORPUSCULAR HEMOGLOBIN: 27.2 PG (ref 26.7–34)
MEAN CORPUSCULAR HGB CONC: 32.1 G/DL (ref 29.9–35.2)
METHAMPHETAMINES SCREEN URINE: NEGATIVE
PLATELET COUNT: 261 10^3/UL (ref 150–450)
RED BLOOD COUNT: 4.37 10^6/UL (ref 4.2–5.4)
TRICYCLIC ANTIDEPRESSANT URINE: NEGATIVE
WHITE BLOOD COUNT: 9.4 10^3/UL (ref 4–11)

## 2024-05-10 PROCEDURE — 36415 COLL VENOUS BLD VENIPUNCTURE: CPT

## 2024-05-10 PROCEDURE — 96376 TX/PRO/DX INJ SAME DRUG ADON: CPT

## 2024-05-10 PROCEDURE — 85027 COMPLETE CBC AUTOMATED: CPT

## 2024-05-10 PROCEDURE — 86900 BLOOD TYPING SEROLOGIC ABO: CPT

## 2024-05-10 PROCEDURE — 86850 RBC ANTIBODY SCREEN: CPT

## 2024-05-10 PROCEDURE — 96375 TX/PRO/DX INJ NEW DRUG ADDON: CPT

## 2024-05-10 PROCEDURE — 96374 THER/PROPH/DIAG INJ IV PUSH: CPT

## 2024-05-10 PROCEDURE — 80307 DRUG TEST PRSMV CHEM ANLYZR: CPT

## 2024-05-10 PROCEDURE — 86901 BLOOD TYPING SEROLOGIC RH(D): CPT

## 2024-05-10 PROCEDURE — 85025 COMPLETE CBC W/AUTO DIFF WBC: CPT

## 2024-05-11 VITALS — DIASTOLIC BLOOD PRESSURE: 73 MMHG | SYSTOLIC BLOOD PRESSURE: 120 MMHG | HEART RATE: 100 BPM

## 2024-05-11 VITALS — TEMPERATURE: 98 F

## 2024-05-11 LAB
ADD MANUAL DIFF: NO
HEMATOCRIT: 30.9 % (ref 36–48)
HEMOGLOBIN: 9.6 G/DL (ref 12–16)
IMMATURE GRANULOCYTES ABS AUTO: 0.04 10^3/UL (ref 0–0.03)
IMMATURE GRANULOCYTES PCT AUTO: 0.4 % (ref 0–0.5)
LYMPHOCYTES  ABSOLUTE AUTO: 1.6 10^3/UL (ref 1.2–3.8)
MCV RBC: 88.3 FL (ref 81–99)
MEAN CORPUSCULAR HEMOGLOBIN: 27.4 PG (ref 26.7–34)
MEAN CORPUSCULAR HGB CONC: 31.1 G/DL (ref 29.9–35.2)
PLATELET COUNT: 175 10^3/UL (ref 150–450)
RED BLOOD COUNT: 3.5 10^6/UL (ref 4.2–5.4)
WHITE BLOOD COUNT: 10 10^3/UL (ref 4–11)

## 2024-05-14 ENCOUNTER — HOSPITAL ENCOUNTER
Dept: HOSPITAL 101 - FBCO | Age: 24
Discharge: HOME | End: 2024-05-14
Payer: COMMERCIAL

## 2024-05-14 VITALS
HEART RATE: 110 BPM | TEMPERATURE: 98.42 F | DIASTOLIC BLOOD PRESSURE: 92 MMHG | SYSTOLIC BLOOD PRESSURE: 143 MMHG | OXYGEN SATURATION: 98 %

## 2024-05-17 ENCOUNTER — HOSPITAL ENCOUNTER
Dept: HOSPITAL 101 - FBCO | Age: 24
Discharge: HOME | End: 2024-05-17
Payer: COMMERCIAL

## 2024-05-17 PROCEDURE — G0463 HOSPITAL OUTPT CLINIC VISIT: HCPCS

## 2024-05-22 ENCOUNTER — HOSPITAL ENCOUNTER
Dept: HOSPITAL 101 - FBCO | Age: 24
Discharge: HOME | End: 2024-05-22
Payer: COMMERCIAL

## 2024-05-22 PROCEDURE — G0463 HOSPITAL OUTPT CLINIC VISIT: HCPCS

## 2024-07-17 ENCOUNTER — PATIENT MESSAGE (OUTPATIENT)
Dept: FAMILY MEDICINE CLINIC | Age: 24
End: 2024-07-17

## 2025-01-28 ENCOUNTER — HOSPITAL ENCOUNTER (OUTPATIENT)
Dept: HOSPITAL 101 - LAB | Age: 25
Discharge: HOME | End: 2025-01-28
Payer: COMMERCIAL

## 2025-01-28 DIAGNOSIS — Z01.419: Primary | ICD-10-CM

## 2025-01-28 PROCEDURE — 88175 CYTOPATH C/V AUTO FLUID REDO: CPT
